# Patient Record
Sex: MALE | Race: WHITE | NOT HISPANIC OR LATINO | Employment: OTHER | URBAN - METROPOLITAN AREA
[De-identification: names, ages, dates, MRNs, and addresses within clinical notes are randomized per-mention and may not be internally consistent; named-entity substitution may affect disease eponyms.]

---

## 2023-05-03 ENCOUNTER — CLINICAL SUPPORT (OUTPATIENT)
Dept: PRIMARY CARE | Facility: CLINIC | Age: 73
End: 2023-05-03
Payer: MEDICARE

## 2023-05-03 DIAGNOSIS — E55.9 VITAMIN D DEFICIENCY: ICD-10-CM

## 2023-05-03 DIAGNOSIS — Z79.899 DRUG THERAPY: ICD-10-CM

## 2023-05-03 DIAGNOSIS — Z13.9 SCREENING FOR CONDITION: ICD-10-CM

## 2023-05-03 LAB
CALCIDIOL (25 OH VITAMIN D3) (NG/ML) IN SER/PLAS: 60 NG/ML
ESTIMATED AVERAGE GLUCOSE FOR HBA1C: 114 MG/DL
HEMOGLOBIN A1C/HEMOGLOBIN TOTAL IN BLOOD: 5.6 %

## 2023-05-03 PROCEDURE — 82306 VITAMIN D 25 HYDROXY: CPT

## 2023-05-03 PROCEDURE — 83036 HEMOGLOBIN GLYCOSYLATED A1C: CPT

## 2023-05-10 ENCOUNTER — OFFICE VISIT (OUTPATIENT)
Dept: PRIMARY CARE | Facility: CLINIC | Age: 73
End: 2023-05-10
Payer: MEDICARE

## 2023-05-10 VITALS
BODY MASS INDEX: 32.2 KG/M2 | OXYGEN SATURATION: 96 % | SYSTOLIC BLOOD PRESSURE: 168 MMHG | HEART RATE: 71 BPM | HEIGHT: 70 IN | WEIGHT: 224.9 LBS | DIASTOLIC BLOOD PRESSURE: 91 MMHG

## 2023-05-10 DIAGNOSIS — B00.9 HERPES SIMPLEX: ICD-10-CM

## 2023-05-10 DIAGNOSIS — R56.9 SEIZURE (MULTI): Primary | ICD-10-CM

## 2023-05-10 DIAGNOSIS — R73.03 PREDIABETES: ICD-10-CM

## 2023-05-10 DIAGNOSIS — E55.9 VITAMIN D DEFICIENCY: ICD-10-CM

## 2023-05-10 DIAGNOSIS — I10 HYPERTENSION, UNSPECIFIED TYPE: ICD-10-CM

## 2023-05-10 DIAGNOSIS — E78.5 HYPERLIPIDEMIA, UNSPECIFIED HYPERLIPIDEMIA TYPE: ICD-10-CM

## 2023-05-10 PROCEDURE — 3008F BODY MASS INDEX DOCD: CPT | Performed by: FAMILY MEDICINE

## 2023-05-10 PROCEDURE — 99214 OFFICE O/P EST MOD 30 MIN: CPT | Performed by: FAMILY MEDICINE

## 2023-05-10 PROCEDURE — 3080F DIAST BP >= 90 MM HG: CPT | Performed by: FAMILY MEDICINE

## 2023-05-10 PROCEDURE — 3077F SYST BP >= 140 MM HG: CPT | Performed by: FAMILY MEDICINE

## 2023-05-10 NOTE — PATIENT INSTRUCTIONS
Patient Discussion/Summary  Next [facility] will be around November.    Lab review today.    ------     Screening for prostate cancer, PSA is 3.16, was 2.3, 1.9, 2.05, 1.39, 3.23. -->> recheck with next annual labs.  Screen for colon cancer, baron colonoscopy around 2019, it was normal, was told to repeat in 5 to 7 years, so that will be around 2024 through 2026.     Hepatitis C screening in November 2022 reveal resolved hepatitis C infection, no viral load.     Need for immunizations. Patient is due for annual flu shot, shingles series, and sounds like due for tetanus/Tdap.. Is up-to-date on coronavirus series. -->> Recommend getting in touch with your pharmacy to get up-to-date on your immunizations.     -----     Obese, BMI 31, no change over the last year. Still down from 230 in 2018.  Patient is planning to increase exercise as well as continue to limit intake of simple carbohydrates.-->> Keep up the good overall work exercising and watching diet.    Seizures.  Had first event around October 2022.  Second event was around January 2023.  Seeing neurology.  Started on Keppra.  Awaiting MRI. -->>  Follow-up as planned.    Next stiffness.  But sounds like it is associated with some paresthesia on the left side.  Does not sound like it is impacting sleep.     Abnormal cardiac CT calcium score of 725, saw Dr. Camacho, had stress test, was told he had minimal blockages and is doing well and did not need tofollow-up with cardiology.     Regarding new labs:   -Prediabetes, new diagnosis. A1c is 5.6, was 5.7, 5.6, 5.5, 5.4, 5.3.  Patient is improving diet, cutting back on simple carbohydrates. -->>  Keep up the good work.  We will recheck with next annual labs.  - Vitamin D deficiency, 60, was 57, 60, 67, 46, 28, 32, our goal is now 75 - 100.  Has been on 50,000 a week for about the last 6 months.  Was on 35,000 weekly for the 2 years before that.  -->>  Creased to 10,000 every day and will recheck in 6 months.    Regarding  previous labs:  - Drug therapy. Screening for condition. -->> recheck annual labs around October 2022.- PSA 2.3, was 1.9, previously 1.39, 3.29. -->> We discussed and will order recheck of PSA with annual labs.  - Hyperlipidemia, HDL was 46, was 41, 39, 41, 37, 44, 42 (oldest), goal is 45 or more, LDL was 68, was 52,57, 57, 51, 49, 113, your goal is 70 or less. -->> Continue atorvastatin 40 mg and CoQ10. Plan to recheck lipids and liver with next annual labs.     Hypertension, BP located today, patient still checking regularly at home and gets numbers 130s/60s. Is on olmesartan/hydrochlorothiazide 40/25.-->> Continue current treatment and continue to monitor blood pressure.      Cold sores, gets up maybe twice a year. He notes the acyclovir is working well when he needs it. -->> will refill as needed.        - Patient will return in November for annual lab review with Medicare wellness visit.   - Come in about a week before the appointment to get the annual labs including PSA screening drawn.

## 2023-05-10 NOTE — PROGRESS NOTES
Subjective   Patient ID: Frankie Guillen is a 72 y.o. male who presents for 6month FU (Pt in today for routine 6 month FU).      Review of Systems  Denies N/V/D/C, no HA/S/V, denies rashes/lesions, no CP.  Has not had for shortness of breath with exertion. Denies fevers/chills.  All other systems were negative.    Objective   Physical Exam  Gen: NAD  eyes: EOMI, PERRLA  ENT: hearing grossly intact, no nasal discharge  resp: CTABL, without R/R  heart: RRR without MRG  GI: abd: S/ND/NT, BS+  lymph: no axillary, cervical, supraclavicular lymphadenopathy noted   MS: gait grossly WNL,  derm: no rashes or lesions noted  neuro: CN II-XII grossly intact  psych: A&Ox3    Assessment/Plan        Pt in today for routine 6 month FU    Patient Discussion/Summary  Next [facility] will be around November.    Lab review today.    ------     Screening for prostate cancer, PSA is 3.16, was 2.3, 1.9, 2.05, 1.39, 3.23. -->> recheck with next annual labs.  Screen for colon cancer, baron colonoscopy around 2019, it was normal, was told to repeat in 5 to 7 years, so that will be around 2024 through 2026.     Hepatitis C screening in November 2022 reveal resolved hepatitis C infection, no viral load.     Need for immunizations. Patient is due for annual flu shot, shingles series, and sounds like due for tetanus/Tdap.. Is up-to-date on coronavirus series. -->> Recommend getting in touch with your pharmacy to get up-to-date on your immunizations.     -----     Obese, BMI 31, no change over the last year. Still down from 230 in 2018.  Patient is planning to increase exercise as well as continue to limit intake of simple carbohydrates.-->> Keep up the good overall work exercising and watching diet.    Seizures.  Had first event around October 2022.  Second event was around January 2023.  Seeing neurology.  Started on Keppra.  Awaiting MRI. -->>  Follow-up as planned.    Next stiffness.  But sounds like it is associated with some paresthesia on the left  side.  Does not sound like it is impacting sleep.     Abnormal cardiac CT calcium score of 725, saw Dr. Camacho, had stress test, was told he had minimal blockages and is doing well and did not need tofollow-up with cardiology.     Regarding new labs:   -Prediabetes, new diagnosis. A1c is 5.6, was 5.7, 5.6, 5.5, 5.4, 5.3.  Patient is improving diet, cutting back on simple carbohydrates. -->>  Keep up the good work.  We will recheck with next annual labs.  - Vitamin D deficiency, 60, was 57, 60, 67, 46, 28, 32, our goal is now 75 - 100.  Has been on 50,000 a week for about the last 6 months.  Was on 35,000 weekly for the 2 years before that.  -->>  Creased to 10,000 every day and will recheck in 6 months.    Regarding previous labs:  - Drug therapy. Screening for condition. -->> recheck annual labs around October 2022.- PSA 2.3, was 1.9, previously 1.39, 3.29. -->> We discussed and will order recheck of PSA with annual labs.  - Hyperlipidemia, HDL was 46, was 41, 39, 41, 37, 44, 42 (oldest), goal is 45 or more, LDL was 68, was 52,57, 57, 51, 49, 113, your goal is 70 or less. -->> Continue atorvastatin 40 mg and CoQ10. Plan to recheck lipids and liver with next annual labs.     Hypertension, BP located today, patient still checking regularly at home and gets numbers 130s/60s. Is on olmesartan/hydrochlorothiazide 40/25.-->> Continue current treatment and continue to monitor blood pressure.      Cold sores, gets up maybe twice a year. He notes the acyclovir is working well when he needs it. -->> will refill as needed.        - Patient will return in November for annual lab review with Medicare wellness visit.   - Come in about a week before the appointment to get the annual labs including PSA screening drawn.

## 2023-07-06 ENCOUNTER — HOSPITAL ENCOUNTER (OUTPATIENT)
Dept: DATA CONVERSION | Facility: HOSPITAL | Age: 73
End: 2023-07-06
Attending: STUDENT IN AN ORGANIZED HEALTH CARE EDUCATION/TRAINING PROGRAM | Admitting: STUDENT IN AN ORGANIZED HEALTH CARE EDUCATION/TRAINING PROGRAM
Payer: MEDICARE

## 2023-07-06 DIAGNOSIS — R56.9 UNSPECIFIED CONVULSIONS (MULTI): ICD-10-CM

## 2023-11-08 ENCOUNTER — CLINICAL SUPPORT (OUTPATIENT)
Dept: PRIMARY CARE | Facility: CLINIC | Age: 73
End: 2023-11-08
Payer: MEDICARE

## 2023-11-08 DIAGNOSIS — Z13.9 SCREENING FOR CONDITION: ICD-10-CM

## 2023-11-08 DIAGNOSIS — Z12.5 SCREENING PSA (PROSTATE SPECIFIC ANTIGEN): ICD-10-CM

## 2023-11-08 DIAGNOSIS — I10 HYPERTENSION, UNSPECIFIED TYPE: ICD-10-CM

## 2023-11-08 DIAGNOSIS — E55.9 VITAMIN D DEFICIENCY: ICD-10-CM

## 2023-11-08 DIAGNOSIS — E78.5 HYPERLIPIDEMIA, UNSPECIFIED HYPERLIPIDEMIA TYPE: ICD-10-CM

## 2023-11-08 DIAGNOSIS — R73.03 PREDIABETES: ICD-10-CM

## 2023-11-08 DIAGNOSIS — Z79.899 DRUG THERAPY: ICD-10-CM

## 2023-11-08 LAB
25(OH)D3 SERPL-MCNC: 71 NG/ML (ref 30–100)
ALBUMIN SERPL BCP-MCNC: 5.1 G/DL (ref 3.4–5)
ALP SERPL-CCNC: 73 U/L (ref 33–136)
ALT SERPL W P-5'-P-CCNC: 25 U/L (ref 10–52)
ANION GAP SERPL CALC-SCNC: 15 MMOL/L (ref 10–20)
APPEARANCE UR: CLEAR
AST SERPL W P-5'-P-CCNC: 19 U/L (ref 9–39)
BASOPHILS # BLD AUTO: 0.07 X10*3/UL (ref 0–0.1)
BASOPHILS NFR BLD AUTO: 0.8 %
BILIRUB SERPL-MCNC: 0.8 MG/DL (ref 0–1.2)
BILIRUB UR STRIP.AUTO-MCNC: NEGATIVE MG/DL
BUN SERPL-MCNC: 20 MG/DL (ref 6–23)
CALCIUM SERPL-MCNC: 10.6 MG/DL (ref 8.6–10.3)
CHLORIDE SERPL-SCNC: 98 MMOL/L (ref 98–107)
CHOLEST SERPL-MCNC: 174 MG/DL (ref 0–199)
CHOLESTEROL/HDL RATIO: 3.9
CO2 SERPL-SCNC: 29 MMOL/L (ref 21–32)
COLOR UR: YELLOW
CREAT SERPL-MCNC: 1.11 MG/DL (ref 0.5–1.3)
EOSINOPHIL # BLD AUTO: 0.32 X10*3/UL (ref 0–0.4)
EOSINOPHIL NFR BLD AUTO: 3.6 %
ERYTHROCYTE [DISTWIDTH] IN BLOOD BY AUTOMATED COUNT: 13.2 % (ref 11.5–14.5)
EST. AVERAGE GLUCOSE BLD GHB EST-MCNC: 117 MG/DL
GFR SERPL CREATININE-BSD FRML MDRD: 70 ML/MIN/1.73M*2
GLUCOSE SERPL-MCNC: 115 MG/DL (ref 74–99)
GLUCOSE UR STRIP.AUTO-MCNC: NEGATIVE MG/DL
HBA1C MFR BLD: 5.7 %
HCT VFR BLD AUTO: 50.3 % (ref 41–52)
HDLC SERPL-MCNC: 44.1 MG/DL
HGB BLD-MCNC: 17.3 G/DL (ref 13.5–17.5)
IMM GRANULOCYTES # BLD AUTO: 0.03 X10*3/UL (ref 0–0.5)
IMM GRANULOCYTES NFR BLD AUTO: 0.3 % (ref 0–0.9)
KETONES UR STRIP.AUTO-MCNC: NEGATIVE MG/DL
LDLC SERPL CALC-MCNC: 76 MG/DL
LEUKOCYTE ESTERASE UR QL STRIP.AUTO: NEGATIVE
LYMPHOCYTES # BLD AUTO: 2.62 X10*3/UL (ref 0.8–3)
LYMPHOCYTES NFR BLD AUTO: 29.7 %
MAGNESIUM SERPL-MCNC: 2.26 MG/DL (ref 1.6–2.4)
MCH RBC QN AUTO: 34.1 PG (ref 26–34)
MCHC RBC AUTO-ENTMCNC: 34.4 G/DL (ref 32–36)
MCV RBC AUTO: 99 FL (ref 80–100)
MONOCYTES # BLD AUTO: 1.15 X10*3/UL (ref 0.05–0.8)
MONOCYTES NFR BLD AUTO: 13.1 %
NEUTROPHILS # BLD AUTO: 4.62 X10*3/UL (ref 1.6–5.5)
NEUTROPHILS NFR BLD AUTO: 52.5 %
NITRITE UR QL STRIP.AUTO: NEGATIVE
NON HDL CHOLESTEROL: 130 MG/DL (ref 0–149)
NRBC BLD-RTO: 0 /100 WBCS (ref 0–0)
PH UR STRIP.AUTO: 7 [PH]
PLATELET # BLD AUTO: 322 X10*3/UL (ref 150–450)
POTASSIUM SERPL-SCNC: 4.8 MMOL/L (ref 3.5–5.3)
PROT SERPL-MCNC: 8.3 G/DL (ref 6.4–8.2)
PROT UR STRIP.AUTO-MCNC: NEGATIVE MG/DL
PSA SERPL-MCNC: 5.45 NG/ML
RBC # BLD AUTO: 5.08 X10*6/UL (ref 4.5–5.9)
RBC # UR STRIP.AUTO: ABNORMAL /UL
RBC #/AREA URNS AUTO: NORMAL /HPF
SODIUM SERPL-SCNC: 137 MMOL/L (ref 136–145)
SP GR UR STRIP.AUTO: 1.02
TRIGL SERPL-MCNC: 272 MG/DL (ref 0–149)
TSH SERPL-ACNC: 2.91 MIU/L (ref 0.44–3.98)
UROBILINOGEN UR STRIP.AUTO-MCNC: <2 MG/DL
VLDL: 54 MG/DL (ref 0–40)
WBC # BLD AUTO: 8.8 X10*3/UL (ref 4.4–11.3)
WBC #/AREA URNS AUTO: NORMAL /HPF

## 2023-11-08 PROCEDURE — 82306 VITAMIN D 25 HYDROXY: CPT

## 2023-11-08 PROCEDURE — 85025 COMPLETE CBC W/AUTO DIFF WBC: CPT

## 2023-11-08 PROCEDURE — G0103 PSA SCREENING: HCPCS

## 2023-11-08 PROCEDURE — 84443 ASSAY THYROID STIM HORMONE: CPT

## 2023-11-08 PROCEDURE — 83036 HEMOGLOBIN GLYCOSYLATED A1C: CPT

## 2023-11-08 PROCEDURE — 80053 COMPREHEN METABOLIC PANEL: CPT

## 2023-11-08 PROCEDURE — 81001 URINALYSIS AUTO W/SCOPE: CPT

## 2023-11-08 PROCEDURE — 36415 COLL VENOUS BLD VENIPUNCTURE: CPT

## 2023-11-08 PROCEDURE — 83735 ASSAY OF MAGNESIUM: CPT

## 2023-11-08 PROCEDURE — 80061 LIPID PANEL: CPT

## 2023-11-08 NOTE — PROGRESS NOTES
Subjective   Patient ID: Frankie uGillen is a 73 y.o. male who presents for Labs Only (Pt in today for lab draw).    HPI     Review of Systems    Objective   There were no vitals taken for this visit.    Physical Exam    Assessment/Plan

## 2023-11-15 ENCOUNTER — OFFICE VISIT (OUTPATIENT)
Dept: PRIMARY CARE | Facility: CLINIC | Age: 73
End: 2023-11-15
Payer: MEDICARE

## 2023-11-15 VITALS
OXYGEN SATURATION: 94 % | WEIGHT: 221 LBS | HEIGHT: 70 IN | SYSTOLIC BLOOD PRESSURE: 156 MMHG | BODY MASS INDEX: 31.64 KG/M2 | DIASTOLIC BLOOD PRESSURE: 87 MMHG | HEART RATE: 64 BPM

## 2023-11-15 DIAGNOSIS — R73.03 PREDIABETES: ICD-10-CM

## 2023-11-15 DIAGNOSIS — E55.9 VITAMIN D DEFICIENCY: ICD-10-CM

## 2023-11-15 DIAGNOSIS — B00.9 HERPES SIMPLEX: ICD-10-CM

## 2023-11-15 DIAGNOSIS — I10 HYPERTENSION, UNSPECIFIED TYPE: ICD-10-CM

## 2023-11-15 DIAGNOSIS — Z13.9 SCREENING FOR CONDITION: ICD-10-CM

## 2023-11-15 DIAGNOSIS — R97.20 ELEVATED PSA: ICD-10-CM

## 2023-11-15 DIAGNOSIS — E66.9 OBESITY WITH SERIOUS COMORBIDITY, UNSPECIFIED CLASSIFICATION, UNSPECIFIED OBESITY TYPE: ICD-10-CM

## 2023-11-15 DIAGNOSIS — R56.9 SEIZURE (MULTI): ICD-10-CM

## 2023-11-15 DIAGNOSIS — E78.5 HYPERLIPIDEMIA, UNSPECIFIED HYPERLIPIDEMIA TYPE: ICD-10-CM

## 2023-11-15 DIAGNOSIS — Z00.00 ROUTINE GENERAL MEDICAL EXAMINATION AT HEALTH CARE FACILITY: Primary | ICD-10-CM

## 2023-11-15 DIAGNOSIS — Z79.899 DRUG THERAPY: ICD-10-CM

## 2023-11-15 PROCEDURE — 3008F BODY MASS INDEX DOCD: CPT | Performed by: FAMILY MEDICINE

## 2023-11-15 PROCEDURE — 3077F SYST BP >= 140 MM HG: CPT | Performed by: FAMILY MEDICINE

## 2023-11-15 PROCEDURE — 1170F FXNL STATUS ASSESSED: CPT | Performed by: FAMILY MEDICINE

## 2023-11-15 PROCEDURE — G0439 PPPS, SUBSEQ VISIT: HCPCS | Performed by: FAMILY MEDICINE

## 2023-11-15 PROCEDURE — 99215 OFFICE O/P EST HI 40 MIN: CPT | Performed by: FAMILY MEDICINE

## 2023-11-15 PROCEDURE — 1036F TOBACCO NON-USER: CPT | Performed by: FAMILY MEDICINE

## 2023-11-15 PROCEDURE — 3079F DIAST BP 80-89 MM HG: CPT | Performed by: FAMILY MEDICINE

## 2023-11-15 PROCEDURE — 3080F DIAST BP >= 90 MM HG: CPT | Performed by: FAMILY MEDICINE

## 2023-11-15 RX ORDER — OLMESARTAN MEDOXOMIL AND HYDROCHLOROTHIAZIDE 40/25 40; 25 MG/1; MG/1
TABLET ORAL
Qty: 90 TABLET | Refills: 3 | Status: SHIPPED | OUTPATIENT
Start: 2023-11-15

## 2023-11-15 RX ORDER — ACYCLOVIR 400 MG/1
TABLET ORAL
Qty: 60 TABLET | Refills: 2
Start: 2023-11-15

## 2023-11-15 RX ORDER — ATORVASTATIN CALCIUM 80 MG/1
TABLET, FILM COATED ORAL
Qty: 90 TABLET | Refills: 3 | Status: SHIPPED | OUTPATIENT
Start: 2023-11-15

## 2023-11-15 ASSESSMENT — ACTIVITIES OF DAILY LIVING (ADL)
BATHING: INDEPENDENT
DRESSING: INDEPENDENT
GROCERY_SHOPPING: INDEPENDENT
DOING_HOUSEWORK: INDEPENDENT
TAKING_MEDICATION: INDEPENDENT
MANAGING_FINANCES: INDEPENDENT

## 2023-11-15 ASSESSMENT — ENCOUNTER SYMPTOMS
OCCASIONAL FEELINGS OF UNSTEADINESS: 0
LOSS OF SENSATION IN FEET: 0
DEPRESSION: 0

## 2023-11-15 NOTE — PATIENT INSTRUCTIONS
Patient Discussion/Summary  Next [facility] and annual lab review.    Lab review today.    ------     Screening for prostate cancer, PSA is 5.4, was 3.16, 2.3, 1.9, 2.05, 1.39, 3.23. -->> recheck in 3 mo.  Screen for colon cancer, had colonoscopy around 2019, it was normal, was told to repeat in 5 to 7 years, so that will be around 2024 through 2026.     Hepatitis C screening in November 2022 reveal resolved hepatitis C infection, no viral load.     Need for immunizations. Patient is due for annual flu shot, shingles series, and sounds like due for tetanus/Tdap.  Due for latest coronavirus booster.  -->> Check with your pharmacy to stay get up-to-date on your immunizations.     -----     Obese with comorbidities, BMI 31, down 3 from last number on chart. Still down from 230 in 2018.  Patient is exercising as well as continue to limit intake of simple carbohydrates.-->> Keep up the good overall work exercising and watching diet.    Seizures.  Had first event around October 2022.  Second event was around January 2023.  Seeing neurology.  No seizures since been on Keppra. -->> Follow-up as planned.    Next stiffness painful left sided posterior occiput.  Worse with certain positions including head rotated right or brushing teeth.  Nerve getting pinched is in the differential.  At this moment patient is coping with it well enough just paying attention to what he is doing.-->>  If it gets bad enough that you would like a referral to physical therapy or orthopedics, call us and I be happy to send a referral either place.  Abnormal cardiac CT calcium score of 725, saw Dr. Camacho, had stress test, was told he had minimal blockages and is doing well and did not need tofollow-up with cardiology.     Regarding new labs:   -Prediabetes.  A1c is 5.7, was 5.6, 5.7, 5.6, 5.5, 5.4, 5.3.  Patient is improving diet, cutting back on simple carbohydrates. -->>  Keep up the good work.  We will recheck with next annual labs.  - Vitamin D  deficiency, 71, was 60, 57, 60, 67, 46, 28, 32, our goal is now 75 - 100.  Has been on 50,000 a week for about the last 6 months.  -->>  Increase to 55,000 a week, so 5000 daily but 4 days a week take 2 pills.  We will recheck with next annual labs.  Creased to 10,000 every day and will recheck in 6 months.  - Drug therapy. Screening for condition. -->> recheck annual labs around nov 2024.  -Elevated PSA, 5.4 on these labs, previously 3.1, 2.3.  -->>  We will recheck in 3 months, if over 4.0 again will refer to urology.  - Hyperlipidemia, HDL was 44, was 46,41, 39, 41, 37, 44, 42 (oldest), goal is 45 or more, LDL 76, was 68, 52, 57, 57, 51, 49, 113, your goal is 70 or less.  Total Bidgood ratio is 3.9, goal is less than 2.8.-->> increase to atorvastatin 80 mg and continue CoQ10. Plan to recheck lipids and liver with next annual labs.     Hypertension, BP elevated today, patient still checking regularly at home and gets numbers 120-130s/60-70s. Is on olmesartan/hydrochlorothiazide 40/25.-->> Continue current treatment and continue to monitor blood pressure.      Cold sores, gets up maybe twice a year. He notes the acyclovir is working well when he needs it. -->> will refill as needed.       - Patient will come in in about 3 months just to get a PSA drawn.  - Patient will return in November 2024 for annual lab review with Medicare wellness visit.   - Come in about a week before the appointment to get the annual labs including PSA screening drawn.

## 2023-11-15 NOTE — PROGRESS NOTES
"Subjective   Reason for Visit: Frankie Guillen is an 73 y.o. male here for a Medicare Wellness visit.     Patient Care Team:  Ricco Silva DO as PCP - General  Ricco Silva DO as PCP [facility]     Review of Systems  Denies N/V/D/C, no HA/S/V, denies rashes/lesions, no CP/SOB. Denies fevers/chills.  All other systems were negative.    Objective   Vitals:  BP (!) 166/91 (BP Location: Left arm, Patient Position: Sitting)   Pulse 65   Ht 1.778 m (5' 10\")   Wt 100 kg (221 lb)   SpO2 99%   BMI 31.71 kg/m²       Physical Exam  Gen: NAD  eyes: EOMI, PERRLA  ENT: hearing grossly intact, no nasal discharge  resp: CTABL, without R/R  heart: RRR without MRG  GI: abd: S/ND/NT, BS+  lymph: no axillary, cervical, supraclavicular lymphadenopathy noted   MS: gait grossly WNL,  derm: no rashes or lesions noted  neuro: CN II-XII grossly intact  psych: A&Ox3    Assessment/Plan   Problem List Items Addressed This Visit    None  Visit Diagnoses       Routine general medical examination at health care facility    -  Primary    Obesity with serious comorbidity, unspecified classification, unspecified obesity type                Patient Discussion/Summary  [facility] and annual lab review.       ------     Screening for prostate cancer, PSA is 5.4, was 3.16, 2.3, 1.9, 2.05, 1.39, 3.23. -->> recheck in 3 mo.  Screen for colon cancer, had colonoscopy around 2019, it was normal, was told to repeat in 5 to 7 years, so that will be around 2024 through 2026.     Hepatitis C screening in November 2022 reveal resolved hepatitis C infection, no viral load.     Need for immunizations. Patient is due for annual flu shot, shingles series, and sounds like due for tetanus/Tdap.  Due for latest coronavirus booster.  -->> Check with your pharmacy to stay get up-to-date on your immunizations.     -----     Obese with comorbidities, BMI 31, down 3 from last number on chart. Still down from 230 in 2018.  Patient is exercising as " well as continue to limit intake of simple carbohydrates.-->> Keep up the good overall work exercising and watching diet.    Seizures.  Had first event around October 2022.  Second event was around January 2023.  Seeing neurology.  No seizures since been on Keppra. -->> Follow-up as planned.    Next stiffness painful left sided posterior occiput.  Worse with certain positions including head rotated right or brushing teeth.  Nerve getting pinched is in the differential.  At this moment patient is coping with it well enough just paying attention to what he is doing.-->>  If it gets bad enough that you would like a referral to physical therapy or orthopedics, call us and I be happy to send a referral either place.  Abnormal cardiac CT calcium score of 725, saw Dr. Camacho, had stress test, was told he had minimal blockages and is doing well and did not need tofollow-up with cardiology.     Regarding new labs:   -Prediabetes.  A1c is 5.7, was 5.6, 5.7, 5.6, 5.5, 5.4, 5.3.  Patient is improving diet, cutting back on simple carbohydrates. -->>  Keep up the good work.  We will recheck with next annual labs.  - Vitamin D deficiency, 71, was 60, 57, 60, 67, 46, 28, 32, our goal is now 75 - 100.  Has been on 50,000 a week for about the last 6 months.  -->>  Increase to 55,000 a week, so 5000 daily but 4 days a week take 2 pills.  We will recheck with next annual labs.  Creased to 10,000 every day and will recheck in 6 months.  - Drug therapy. Screening for condition. -->> recheck annual labs around nov 2024.  -Elevated PSA, 5.4 on these labs, previously 3.1, 2.3.  -->>  We will recheck in 3 months, if over 4.0 again will refer to urology.  - Hyperlipidemia, HDL was 44, was 46,41, 39, 41, 37, 44, 42 (oldest), goal is 45 or more, LDL 76, was 68, 52, 57, 57, 51, 49, 113, your goal is 70 or less.  Total Bidgood ratio is 3.9, goal is less than 2.8.-->> increase to atorvastatin 80 mg and continue CoQ10. Plan to recheck lipids and  liver with next annual labs.     Hypertension, BP elevated today, patient still checking regularly at home and gets numbers 120-130s/60-70s. Is on olmesartan/hydrochlorothiazide 40/25.-->> Continue current treatment and continue to monitor blood pressure.      Cold sores, gets up maybe twice a year. He notes the acyclovir is working well when he needs it. -->> will refill as needed.       - We will recheck blood pressure before patient leaves so we get a good resting value.  - Patient will come in in about 3 months just to get a PSA drawn.  - Patient will return in November 2024 for annual lab review with Medicare wellness visit.   - Come in about a week before the appointment to get the annual labs including PSA screening drawn.

## 2024-02-19 ENCOUNTER — CLINICAL SUPPORT (OUTPATIENT)
Dept: PRIMARY CARE | Facility: CLINIC | Age: 74
End: 2024-02-19
Payer: MEDICARE

## 2024-02-19 DIAGNOSIS — R97.20 ELEVATED PSA: ICD-10-CM

## 2024-02-19 LAB — PSA SERPL-MCNC: 5.45 NG/ML

## 2024-02-19 PROCEDURE — 84153 ASSAY OF PSA TOTAL: CPT

## 2024-02-19 PROCEDURE — 36415 COLL VENOUS BLD VENIPUNCTURE: CPT

## 2024-02-19 NOTE — PROGRESS NOTES
Subjective   Patient ID: Frankie Guillen is a 73 y.o. male who presents for Labs Only (Pt in today for lab draw).    HPI     Review of Systems    Objective   There were no vitals taken for this visit.    Physical Exam    Assessment/Plan          
68

## 2024-11-06 ENCOUNTER — APPOINTMENT (OUTPATIENT)
Dept: PRIMARY CARE | Facility: CLINIC | Age: 74
End: 2024-11-06
Payer: MEDICARE

## 2024-11-06 DIAGNOSIS — R97.20 ELEVATED PSA: ICD-10-CM

## 2024-11-06 DIAGNOSIS — E55.9 VITAMIN D DEFICIENCY: ICD-10-CM

## 2024-11-06 DIAGNOSIS — Z79.899 DRUG THERAPY: ICD-10-CM

## 2024-11-06 DIAGNOSIS — Z13.9 SCREENING FOR CONDITION: ICD-10-CM

## 2024-11-06 LAB
25(OH)D3 SERPL-MCNC: 78 NG/ML (ref 30–100)
ALBUMIN SERPL BCP-MCNC: 4.3 G/DL (ref 3.4–5)
ALP SERPL-CCNC: 68 U/L (ref 33–136)
ALT SERPL W P-5'-P-CCNC: 21 U/L (ref 10–52)
ANION GAP SERPL CALC-SCNC: 13 MMOL/L (ref 10–20)
APPEARANCE UR: CLEAR
AST SERPL W P-5'-P-CCNC: 14 U/L (ref 9–39)
BASOPHILS # BLD AUTO: 0.06 X10*3/UL (ref 0–0.1)
BASOPHILS NFR BLD AUTO: 0.7 %
BILIRUB SERPL-MCNC: 0.5 MG/DL (ref 0–1.2)
BILIRUB UR STRIP.AUTO-MCNC: NEGATIVE MG/DL
BUN SERPL-MCNC: 18 MG/DL (ref 6–23)
CALCIUM SERPL-MCNC: 9.4 MG/DL (ref 8.6–10.3)
CHLORIDE SERPL-SCNC: 102 MMOL/L (ref 98–107)
CHOLEST SERPL-MCNC: 123 MG/DL (ref 0–199)
CHOLESTEROL/HDL RATIO: 3.4
CO2 SERPL-SCNC: 28 MMOL/L (ref 21–32)
COLOR UR: NORMAL
CREAT SERPL-MCNC: 1.19 MG/DL (ref 0.5–1.3)
EGFRCR SERPLBLD CKD-EPI 2021: 64 ML/MIN/1.73M*2
EOSINOPHIL # BLD AUTO: 0.44 X10*3/UL (ref 0–0.4)
EOSINOPHIL NFR BLD AUTO: 4.9 %
ERYTHROCYTE [DISTWIDTH] IN BLOOD BY AUTOMATED COUNT: 13.1 % (ref 11.5–14.5)
EST. AVERAGE GLUCOSE BLD GHB EST-MCNC: 131 MG/DL
GLUCOSE SERPL-MCNC: 113 MG/DL (ref 74–99)
GLUCOSE UR STRIP.AUTO-MCNC: NORMAL MG/DL
HBA1C MFR BLD: 6.2 %
HCT VFR BLD AUTO: 44.2 % (ref 41–52)
HDLC SERPL-MCNC: 36 MG/DL
HGB BLD-MCNC: 15.1 G/DL (ref 13.5–17.5)
IMM GRANULOCYTES # BLD AUTO: 0.04 X10*3/UL (ref 0–0.5)
IMM GRANULOCYTES NFR BLD AUTO: 0.4 % (ref 0–0.9)
KETONES UR STRIP.AUTO-MCNC: NEGATIVE MG/DL
LDLC SERPL CALC-MCNC: 61 MG/DL
LEUKOCYTE ESTERASE UR QL STRIP.AUTO: NEGATIVE
LYMPHOCYTES # BLD AUTO: 2.03 X10*3/UL (ref 0.8–3)
LYMPHOCYTES NFR BLD AUTO: 22.5 %
MAGNESIUM SERPL-MCNC: 1.99 MG/DL (ref 1.6–2.4)
MCH RBC QN AUTO: 33.3 PG (ref 26–34)
MCHC RBC AUTO-ENTMCNC: 34.2 G/DL (ref 32–36)
MCV RBC AUTO: 98 FL (ref 80–100)
MONOCYTES # BLD AUTO: 0.93 X10*3/UL (ref 0.05–0.8)
MONOCYTES NFR BLD AUTO: 10.3 %
NEUTROPHILS # BLD AUTO: 5.54 X10*3/UL (ref 1.6–5.5)
NEUTROPHILS NFR BLD AUTO: 61.2 %
NITRITE UR QL STRIP.AUTO: NEGATIVE
NON HDL CHOLESTEROL: 87 MG/DL (ref 0–149)
NRBC BLD-RTO: 0 /100 WBCS (ref 0–0)
PH UR STRIP.AUTO: 5.5 [PH]
PLATELET # BLD AUTO: 298 X10*3/UL (ref 150–450)
POTASSIUM SERPL-SCNC: 4.6 MMOL/L (ref 3.5–5.3)
PROT SERPL-MCNC: 6.9 G/DL (ref 6.4–8.2)
PROT UR STRIP.AUTO-MCNC: NEGATIVE MG/DL
PSA SERPL-MCNC: 5.25 NG/ML
RBC # BLD AUTO: 4.53 X10*6/UL (ref 4.5–5.9)
RBC # UR STRIP.AUTO: NEGATIVE /UL
SODIUM SERPL-SCNC: 138 MMOL/L (ref 136–145)
SP GR UR STRIP.AUTO: 1.01
TRIGL SERPL-MCNC: 132 MG/DL (ref 0–149)
TSH SERPL-ACNC: 1.66 MIU/L (ref 0.44–3.98)
UROBILINOGEN UR STRIP.AUTO-MCNC: NORMAL MG/DL
VLDL: 26 MG/DL (ref 0–40)
WBC # BLD AUTO: 9 X10*3/UL (ref 4.4–11.3)

## 2024-11-06 PROCEDURE — 83735 ASSAY OF MAGNESIUM: CPT

## 2024-11-06 PROCEDURE — 81003 URINALYSIS AUTO W/O SCOPE: CPT

## 2024-11-06 PROCEDURE — 83036 HEMOGLOBIN GLYCOSYLATED A1C: CPT

## 2024-11-06 PROCEDURE — 85025 COMPLETE CBC W/AUTO DIFF WBC: CPT

## 2024-11-06 PROCEDURE — 84153 ASSAY OF PSA TOTAL: CPT

## 2024-11-06 PROCEDURE — 82306 VITAMIN D 25 HYDROXY: CPT

## 2024-11-06 PROCEDURE — 84443 ASSAY THYROID STIM HORMONE: CPT

## 2024-11-06 PROCEDURE — 80061 LIPID PANEL: CPT

## 2024-11-06 PROCEDURE — 80053 COMPREHEN METABOLIC PANEL: CPT

## 2024-11-06 NOTE — PROGRESS NOTES
Subjective   Patient ID: Frankie Guillen is a 74 y.o. male who presents for Labs Only (Pt in today for lab draw).    HPI     Review of Systems    Objective   There were no vitals taken for this visit.    Physical Exam    Assessment/Plan

## 2024-11-13 ENCOUNTER — APPOINTMENT (OUTPATIENT)
Dept: PRIMARY CARE | Facility: CLINIC | Age: 74
End: 2024-11-13
Payer: MEDICARE

## 2024-11-16 DIAGNOSIS — R97.20 ELEVATED PSA: Primary | ICD-10-CM

## 2024-11-16 DIAGNOSIS — R56.9 SEIZURE (MULTI): Primary | ICD-10-CM

## 2024-11-16 DIAGNOSIS — I10 HYPERTENSION, UNSPECIFIED TYPE: ICD-10-CM

## 2024-11-18 RX ORDER — LEVETIRACETAM 500 MG/1
500 TABLET ORAL 2 TIMES DAILY
Qty: 180 TABLET | Refills: 3 | Status: SHIPPED | OUTPATIENT
Start: 2024-11-18

## 2024-11-18 NOTE — TELEPHONE ENCOUNTER
I am referring patient to urology for elevated PSA.    Was scheduled for Medicare wellness visit last week, it got canceled.  Please reschedule.  Once rescheduled I can send in refill for olmesartan hydrochlorothiazide.  Thanks.

## 2024-11-19 RX ORDER — OLMESARTAN MEDOXOMIL AND HYDROCHLOROTHIAZIDE 40/25 40; 25 MG/1; MG/1
TABLET ORAL
Qty: 90 TABLET | Refills: 3 | Status: SHIPPED | OUTPATIENT
Start: 2024-11-19

## 2024-12-04 ENCOUNTER — APPOINTMENT (OUTPATIENT)
Dept: PRIMARY CARE | Facility: CLINIC | Age: 74
End: 2024-12-04
Payer: MEDICARE

## 2024-12-04 VITALS
WEIGHT: 217 LBS | DIASTOLIC BLOOD PRESSURE: 83 MMHG | SYSTOLIC BLOOD PRESSURE: 150 MMHG | HEART RATE: 76 BPM | OXYGEN SATURATION: 94 % | HEIGHT: 70 IN | BODY MASS INDEX: 31.07 KG/M2

## 2024-12-04 DIAGNOSIS — Z00.00 ROUTINE GENERAL MEDICAL EXAMINATION AT HEALTH CARE FACILITY: Primary | ICD-10-CM

## 2024-12-04 DIAGNOSIS — I10 HYPERTENSION, UNSPECIFIED TYPE: ICD-10-CM

## 2024-12-04 DIAGNOSIS — E55.9 VITAMIN D DEFICIENCY: ICD-10-CM

## 2024-12-04 DIAGNOSIS — R97.20 ELEVATED PSA: ICD-10-CM

## 2024-12-04 DIAGNOSIS — I25.10 CORONARY ARTERY DISEASE INVOLVING NATIVE CORONARY ARTERY OF NATIVE HEART WITHOUT ANGINA PECTORIS: ICD-10-CM

## 2024-12-04 DIAGNOSIS — E78.5 HYPERLIPIDEMIA, UNSPECIFIED HYPERLIPIDEMIA TYPE: ICD-10-CM

## 2024-12-04 DIAGNOSIS — B00.9 HERPES SIMPLEX: ICD-10-CM

## 2024-12-04 DIAGNOSIS — R56.9 SEIZURE (MULTI): ICD-10-CM

## 2024-12-04 DIAGNOSIS — R73.03 PREDIABETES: ICD-10-CM

## 2024-12-04 DIAGNOSIS — Z79.899 DRUG THERAPY: ICD-10-CM

## 2024-12-04 DIAGNOSIS — Z13.9 SCREENING FOR CONDITION: ICD-10-CM

## 2024-12-04 PROCEDURE — 1036F TOBACCO NON-USER: CPT | Performed by: FAMILY MEDICINE

## 2024-12-04 PROCEDURE — 3077F SYST BP >= 140 MM HG: CPT | Performed by: FAMILY MEDICINE

## 2024-12-04 PROCEDURE — 1158F ADVNC CARE PLAN TLK DOCD: CPT | Performed by: FAMILY MEDICINE

## 2024-12-04 PROCEDURE — 3079F DIAST BP 80-89 MM HG: CPT | Performed by: FAMILY MEDICINE

## 2024-12-04 PROCEDURE — 99214 OFFICE O/P EST MOD 30 MIN: CPT | Performed by: FAMILY MEDICINE

## 2024-12-04 PROCEDURE — 1159F MED LIST DOCD IN RCRD: CPT | Performed by: FAMILY MEDICINE

## 2024-12-04 PROCEDURE — 1123F ACP DISCUSS/DSCN MKR DOCD: CPT | Performed by: FAMILY MEDICINE

## 2024-12-04 PROCEDURE — G0439 PPPS, SUBSEQ VISIT: HCPCS | Performed by: FAMILY MEDICINE

## 2024-12-04 PROCEDURE — 1170F FXNL STATUS ASSESSED: CPT | Performed by: FAMILY MEDICINE

## 2024-12-04 RX ORDER — ROSUVASTATIN CALCIUM 40 MG/1
40 TABLET, COATED ORAL DAILY
Qty: 100 TABLET | Refills: 3 | Status: SHIPPED | OUTPATIENT
Start: 2024-12-04 | End: 2026-01-08

## 2024-12-04 ASSESSMENT — ACTIVITIES OF DAILY LIVING (ADL)
DOING_HOUSEWORK: INDEPENDENT
DRESSING: INDEPENDENT
TAKING_MEDICATION: INDEPENDENT
MANAGING_FINANCES: INDEPENDENT
GROCERY_SHOPPING: INDEPENDENT
BATHING: INDEPENDENT

## 2024-12-04 ASSESSMENT — ENCOUNTER SYMPTOMS
LOSS OF SENSATION IN FEET: 0
DEPRESSION: 0
OCCASIONAL FEELINGS OF UNSTEADINESS: 0

## 2024-12-04 ASSESSMENT — PATIENT HEALTH QUESTIONNAIRE - PHQ9
2. FEELING DOWN, DEPRESSED OR HOPELESS: NOT AT ALL
SUM OF ALL RESPONSES TO PHQ9 QUESTIONS 1 AND 2: 0
1. LITTLE INTEREST OR PLEASURE IN DOING THINGS: NOT AT ALL

## 2024-12-04 NOTE — PROGRESS NOTES
"Subjective   Reason for Visit: Frankie Guillen is an 74 y.o. male here for a Medicare Wellness visit.     Patient Care Team:  Ricco Silva DO as PCP - General  [place] [facility] [place] [place]     Review of Systems  Denies N/V/D/C, no HA/S/V, denies rashes/lesions, no CP/SOB. Denies fevers/chills.  All other systems were negative.    Objective   Vitals:  /83 (BP Location: Left arm, Patient Position: Sitting, BP Cuff Size: Large adult)   Pulse 76   Ht 1.778 m (5' 10\")   Wt 98.4 kg (217 lb)   SpO2 94%   BMI 31.14 kg/m²       Physical Exam  Gen: NAD  eyes: EOMI, PERRLA  ENT: hearing grossly intact, no nasal discharge  resp: CTABL, without R/R  heart: RRR without MRG  GI: abd: S/ND/NT, BS+  lymph: no axillary, cervical, supraclavicular lymphadenopathy noted   MS: gait grossly WNL,  derm: no rashes or lesions noted  neuro: CN II-XII grossly intact  psych: A&Ox3    Assessment/Plan   Problem List Items Addressed This Visit       Routine general medical examination at health care facility    Elevated PSA    Hyperlipidemia    Relevant Medications    rosuvastatin (Crestor) 40 mg tablet    Other Relevant Orders    Lipid Panel    Hypertension    Vitamin D deficiency    Relevant Orders    Vitamin D 25-Hydroxy,Total (for eval of Vitamin D levels)    Drug therapy    Relevant Orders    CBC and Auto Differential    Comprehensive Metabolic Panel    Magnesium    Urinalysis with Reflex Microscopic    Seizure (Multi)    Prediabetes    Relevant Orders    Hemoglobin A1C    Screening for condition    Relevant Orders    TSH with reflex to Free T4 if abnormal    Herpes simplex    BMI 31.0-31.9,adult     Other Visit Diagnoses       Coronary artery disease involving native coronary artery of native heart without angina pectoris    -  Primary    Relevant Orders    Referral to Cardiology              Patient Discussion/Summary  [facility] and annual lab review.       ------     Screening for prostate cancer, " PSA is 5.25, was 5.4, 3.16, 2.3, 1.9, 2.05, 1.39, 3.23.  Patient has recently started taking an over-the-counter prostate pill. -->>  Patient is scheduled to see urology in the next few months.     Hepatitis C screening in November 2022 reveal resolved hepatitis C infection, no viral load.     Immunization counseling: Patient is due for annual flu shot, shingles series, and sounds like due for tetanus/Tdap.  Due for latest coronavirus booster.  -->> Check with your pharmacy to stay get up-to-date on your immunizations.     -----     Obese with comorbidities, BMI 31, down another 4 from a year ago. Still down from 230 in 2018.  Patient is exercising as well as continue to limit intake of simple carbohydrates.-->> Keep up the good overall work exercising and watching diet.    Seizures.  Had first event around October 2022.  Second event was around January 2023.  Seeing neurology/Dr. THANH Hidalgo.  No seizures since been on Keppra. -->> Follow-up as planned.    Next stiffness. Improved. Has been doing accupuncture for feet. -->> If it gets bad enough that you would like a referral to physical therapy or orthopedics, call us and I be happy to send a referral either place.    CAD, Abnormal cardiac CT calcium score of 725, saw Dr. Camacho, had stress test, was told he had minimal blockages.  Probably stand more recent reevaluation. -->>  Refer to cardiology for evaluation and treatment.     Regarding new labs:   - Prediabetes.  A1c is 6.2, was 5.7, 5.6, 5.7, 5.6, 5.5, 5.4, 5.3.  Patient is improving diet, cutting back on simple carbohydrates. -->>  Keep up the good work.  We will recheck with next annual labs.  - Vitamin D deficiency, 78, was 71, 60, 57, 60, 67, 46, 28, 32, our goal is now 75 - 100.  Has been on 50,000 a week for about the last 18 months.  -->>  Continue current dosing.  Recheck with annual labs.  - Drug therapy. Screening for condition. -->>  ADELINE within normal limits, so no thyroid problems noted.    -  Elevated PSA, 5.45.25, was 5.45, 5.45, 3.1, 2.3.  -->>  We will recheck in 3 months, if over 4.0 again will refer to urology.  - Hyperlipidemia, LDL 61, goal is less than 70.  HDL is 36 would like it over 45.  Total to good ratio is 3.4, it would be nice if it is under 2.8.  Cholesterol is fairly well-controlled on atorvastatin 80 mg plus co-Q10.  However with the A1c increase will switch to rosuvastatin.-->>  Discontinue atorvastatin, start rosuvastatin 40 mg daily.  Continue co-Q10.  Plan to recheck lipids and liver with next annual labs.     Hypertension, BP elevated today, patient still checking regularly at home and gets numbers 120-130s/60-70s. Is on olmesartan/hydrochlorothiazide 40/25.-->> Continue current treatment and continue to monitor blood pressure.      Cold sores, gets up maybe twice a year. He notes the acyclovir is working well when he needs it. -->> will refill as needed.         - Patient will return in November 2025 for annual lab review with Medicare wellness visit.   - Come in about a week before the appointment to get the annual labs drawn.

## 2024-12-04 NOTE — PATIENT INSTRUCTIONS
[facility] [place] and annual lab review.       ------     Screening for prostate cancer, PSA is 5.25, was 5.4, 3.16, 2.3, 1.9, 2.05, 1.39, 3.23.  Patient has recently started taking an over-the-counter prostate pill. -->>  Patient is scheduled to see urology in the next few months.     Hepatitis C screening in November 2022 reveal resolved hepatitis C infection, no viral load.     Immunization counseling: Patient is due for annual flu shot, shingles series, and sounds like due for tetanus/Tdap.  Due for latest coronavirus booster.  -->> Check with your pharmacy to stay get up-to-date on your immunizations.     -----     Obese with comorbidities, BMI 31, down another 4 from a year ago. Still down from 230 in 2018.  Patient is exercising as well as continue to limit intake of simple carbohydrates.-->> Keep up the good overall work exercising and watching diet.    Seizures.  Had first event around October 2022.  Second event was around January 2023.  Seeing neurology/Dr. THANH Hidalgo.  No seizures since been on Keppra. -->> Follow-up as planned.    Next stiffness. Improved. Has been doing accupuncture for feet. -->> If it gets bad enough that you would like a referral to physical therapy or orthopedics, call us and I be happy to send a referral either place.    CAD, Abnormal cardiac CT calcium score of 725, saw Dr. Camacho, had stress test, was told he had minimal blockages.  Probably stand more recent reevaluation. -->>  Refer to cardiology for evaluation and treatment.     Regarding new labs:   - Prediabetes.  A1c is 6.2, was 5.7, 5.6, 5.7, 5.6, 5.5, 5.4, 5.3.  Patient is improving diet, cutting back on simple carbohydrates. -->>  Keep up the good work.  We will recheck with next annual labs.  - Vitamin D deficiency, 78, was 71, 60, 57, 60, 67, 46, 28, 32, our goal is now 75 - 100.  Has been on 50,000 a week for about the last 18 months.  -->>  Continue current dosing.  Recheck with annual labs.  - Drug therapy. Screening for  condition. -->>  ADELINE within normal limits, so no thyroid problems noted.    - Elevated PSA, 5.45.25, was 5.45, 5.45, 3.1, 2.3.  -->>  We will recheck in 3 months, if over 4.0 again will refer to urology.  - Hyperlipidemia, LDL 61, goal is less than 70.  HDL is 36 would like it over 45.  Total to good ratio is 3.4, it would be nice if it is under 2.8.  Cholesterol is fairly well-controlled on atorvastatin 80 mg plus co-Q10.  However with the A1c increase will switch to rosuvastatin.-->>  Discontinue atorvastatin, start rosuvastatin 40 mg daily.  Continue co-Q10.  Plan to recheck lipids and liver with next annual labs.     Hypertension, BP elevated today, patient still checking regularly at home and gets numbers 120-130s/60-70s. Is on olmesartan/hydrochlorothiazide 40/25.-->> Continue current treatment and continue to monitor blood pressure.      Cold sores, gets up maybe twice a year. He notes the acyclovir is working well when he needs it. -->> will refill as needed.         - Patient will return in November 2025 for annual lab review with Medicare wellness visit.   - Come in about a week before the appointment to get the annual labs drawn.

## 2024-12-24 DIAGNOSIS — E78.5 HYPERLIPIDEMIA, UNSPECIFIED HYPERLIPIDEMIA TYPE: ICD-10-CM

## 2024-12-26 RX ORDER — ATORVASTATIN CALCIUM 80 MG/1
TABLET, FILM COATED ORAL
Qty: 90 TABLET | Refills: 3 | OUTPATIENT
Start: 2024-12-26

## 2024-12-27 NOTE — TELEPHONE ENCOUNTER
He was switched to crestor/rosuvastatin on 12/4/24. He is no longer on atorvastatin/lipitor. (But should be taking crestor/rosuvastatin 4mg). Thanks.

## 2025-01-02 ENCOUNTER — APPOINTMENT (OUTPATIENT)
Dept: UROLOGY | Facility: CLINIC | Age: 75
End: 2025-01-02
Payer: MEDICARE

## 2025-01-06 RX ORDER — ROSUVASTATIN CALCIUM 40 MG/1
40 TABLET, COATED ORAL DAILY
Qty: 100 TABLET | Refills: 3 | Status: SHIPPED | OUTPATIENT
Start: 2025-01-06 | End: 2026-02-10

## 2025-01-13 ENCOUNTER — APPOINTMENT (OUTPATIENT)
Dept: CARDIOLOGY | Facility: CLINIC | Age: 75
End: 2025-01-13
Payer: MEDICARE

## 2025-02-27 ENCOUNTER — OFFICE VISIT (OUTPATIENT)
Dept: UROLOGY | Facility: CLINIC | Age: 75
End: 2025-02-27
Payer: MEDICARE

## 2025-02-27 DIAGNOSIS — R97.20 ELEVATED PSA: ICD-10-CM

## 2025-02-27 PROCEDURE — G2211 COMPLEX E/M VISIT ADD ON: HCPCS | Performed by: UROLOGY

## 2025-02-27 PROCEDURE — 1036F TOBACCO NON-USER: CPT | Performed by: UROLOGY

## 2025-02-27 PROCEDURE — 1160F RVW MEDS BY RX/DR IN RCRD: CPT | Performed by: UROLOGY

## 2025-02-27 PROCEDURE — 99203 OFFICE O/P NEW LOW 30 MIN: CPT | Performed by: UROLOGY

## 2025-02-27 PROCEDURE — 99213 OFFICE O/P EST LOW 20 MIN: CPT | Performed by: UROLOGY

## 2025-02-27 PROCEDURE — 1159F MED LIST DOCD IN RCRD: CPT | Performed by: UROLOGY

## 2025-02-27 PROCEDURE — 1123F ACP DISCUSS/DSCN MKR DOCD: CPT | Performed by: UROLOGY

## 2025-02-27 RX ORDER — ALLOPURINOL 100 MG/1
100 TABLET ORAL DAILY
COMMUNITY
Start: 2025-02-02

## 2025-02-27 ASSESSMENT — ENCOUNTER SYMPTOMS
LOSS OF SENSATION IN FEET: 0
DEPRESSION: 0
OCCASIONAL FEELINGS OF UNSTEADINESS: 0

## 2025-02-27 ASSESSMENT — PATIENT HEALTH QUESTIONNAIRE - PHQ9
1. LITTLE INTEREST OR PLEASURE IN DOING THINGS: NOT AT ALL
2. FEELING DOWN, DEPRESSED OR HOPELESS: NOT AT ALL
SUM OF ALL RESPONSES TO PHQ9 QUESTIONS 1 AND 2: 0

## 2025-02-27 NOTE — PROGRESS NOTES
CHIEF COMPLAINT:  Presents to the office today to discuss:  1. Elevated PSA    HPI TODAY: 02/27/2025:  PSA elevation noted with recent value of 5.25 on 11/06/2024 and prior value of 5.45 on 02/19/2024. Recent urinalysis was bland with no hematuria. Recently started saw palmetto supplementation. A1c is 6.2.    Urinary Symptoms:  - Reports occasional weak stream, particularly at night  - Occasional hesitancy, not frequent  - Nocturia 1-2 times per night, occasionally up to 3-4 times with increased fluid intake before bed  - Denies gross hematuria  - Not bothered    PSA History:  - 02/19/2024: 5.45  - 11/06/2024: 5.25    PAST MEDICAL HISTORY:  - Hypertension  - Hyperlipidemia  - BMI 31  - Seizure disorder (diagnosed 2024, on Keppra)  - Herpes (on acyclovir)  - Appendectomy at age 11    SOCIAL:  No information provided    REVIEW OF SYSTEMS:  A tailored review of systems was performed and all pertinent positives and negatives are listed in the HPI.    PHYSICAL EXAMINATION:  Gen: NAD  Pulm: No increased WOB on RA  Cards: WWP  Abd: SNTND, no palpable masses  No CVAT  : MARCIA reveals enlarged, smooth prostate without nodules, roughly 50-60g gland    ASSESSMENT/PLAN:    Elevated PSA (R97.2)  - Assessment: Mildly elevated PSA likely due to prostatic enlargement given exam findings and urinary symptoms  - Plan: Repeat PSA in 1 week (to avoid interference from MARCIA), then again in 6 months. Will defer prostate MRI at this time given exam findings suggesting benign prostatic enlargement as etiology. If significant PSA elevation noted on repeat testing, will proceed with prostate MRI  - Counseling: Discussed PSA elevation, including common causes such as benign prostatic hyperplasia. Explained PSA density concept and current findings suggesting benign etiology    The patient provided verbal consent for the audio recording of today's encounter using AI.

## 2025-03-10 LAB — PSA SERPL-MCNC: 5.97 NG/ML

## 2025-06-13 ENCOUNTER — OFFICE VISIT (OUTPATIENT)
Dept: URGENT CARE | Age: 75
End: 2025-06-13
Payer: MEDICARE

## 2025-06-13 ENCOUNTER — ANCILLARY PROCEDURE (OUTPATIENT)
Dept: URGENT CARE | Age: 75
End: 2025-06-13
Payer: MEDICARE

## 2025-06-13 VITALS
SYSTOLIC BLOOD PRESSURE: 135 MMHG | DIASTOLIC BLOOD PRESSURE: 70 MMHG | HEIGHT: 70 IN | RESPIRATION RATE: 18 BRPM | TEMPERATURE: 97.9 F | OXYGEN SATURATION: 94 % | BODY MASS INDEX: 31.07 KG/M2 | WEIGHT: 217 LBS | HEART RATE: 85 BPM

## 2025-06-13 DIAGNOSIS — S70.02XA CONTUSION OF LEFT HIP, INITIAL ENCOUNTER: Primary | ICD-10-CM

## 2025-06-13 DIAGNOSIS — S79.912A HIP INJURY, LEFT, INITIAL ENCOUNTER: ICD-10-CM
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
## 2025-06-13 PROBLEM — E66.9 OBESITY WITH BODY MASS INDEX 30 OR GREATER: Status: ACTIVE | Noted: 2025-06-13

## 2025-06-13 PROBLEM — M19.90 OSTEOARTHRITIS: Status: ACTIVE | Noted: 2025-06-13

## 2025-06-13 PROBLEM — E66.3 OVERWEIGHT: Status: ACTIVE | Noted: 2025-06-13

## 2025-06-13 PROBLEM — B00.9 HERPESVIRUS INFECTION: Status: ACTIVE | Noted: 2025-06-13

## 2025-06-13 PROBLEM — M25.552 HIP PAIN, LEFT: Status: ACTIVE | Noted: 2025-06-13

## 2025-06-13 PROBLEM — R56.9 SEIZURES (MULTI): Status: ACTIVE | Noted: 2025-06-13

## 2025-06-13 PROBLEM — B00.89 HERPES DERMATITIS: Status: ACTIVE | Noted: 2025-06-13

## 2025-06-13 PROBLEM — I10 BENIGN ESSENTIAL HYPERTENSION: Status: ACTIVE | Noted: 2025-06-13

## 2025-06-13 PROBLEM — R73.03 PRE-DIABETES: Status: ACTIVE | Noted: 2025-06-13

## 2025-06-13 PROBLEM — M25.559 ARTHRALGIA OF HIP: Status: ACTIVE | Noted: 2025-06-13

## 2025-06-13 PROCEDURE — 73502 X-RAY EXAM HIP UNI 2-3 VIEWS: CPT | Mod: LEFT SIDE

## 2025-06-13 RX ORDER — LIDOCAINE 50 MG/G
1 PATCH TOPICAL DAILY
Qty: 20 PATCH | Refills: 0 | Status: SHIPPED | OUTPATIENT
Start: 2025-06-13 | End: 2025-06-23

## 2025-06-13 NOTE — PROGRESS NOTES
"Subjective   Patient ID: Frankie Guillen is a 74 y.o. male. They present today with a chief complaint of Hip Injury (Fell by pool 6/9 hit hip and has been in a lot of pain, unable to sit for a long period of time).    History of Present Illness  The patient presents five days after he sustained a mechanical fall while he was cleaning his pool. He states that he stepped into a hole, which caused him to trip and injure his left hip. He denies head injury, neck pain, or anticoagulant use. He was not dizzy, lightheaded, or experiencing chest pain prior to his fall. The pain is located on the posterior aspect of the hip. He states that he is unable to sit; however, he is able to walk. When sitting, he rates his pain 8 to 9 out of 10. Last night he took two ibuprofen, but he has not taken any pain medication this morning. He denies bruising, numbness, or paresthesia.     Review of Systems  Constitutional: Negative for fever, chills, anorexia, weight loss, malaise   Musculoskeletal: See HPI  Neurological: Negative for weakness, paresthesia    Skin: Negative for mass, pain, itching, rash, ulcer    Hematologic/Lymph: Negative bruising  All other systems are negative        History provided by:  Patient   used: No        Past Medical History  Allergies as of 06/13/2025    (No Known Allergies)       Prescriptions Prior to Admission[1]     Medical History[2]    Surgical History[3]     reports that he quit smoking about 40 years ago. His smoking use included cigarettes. He has never used smokeless tobacco.    Review of Systems  Review of Systems                               Objective    Vitals:    06/13/25 0900   BP: 135/70   Pulse: 85   Resp: 18   Temp: 36.6 °C (97.9 °F)   SpO2: 94%   Weight: 98.4 kg (217 lb)   Height: 1.778 m (5' 10\")     No LMP for male patient.    Physical Exam  Vitals and nursing note reviewed.   Constitutional:       General: He is not in acute distress.     Appearance: Normal " appearance. He is normal weight. He is not ill-appearing or diaphoretic.   Cardiovascular:      Rate and Rhythm: Normal rate.   Pulmonary:      Effort: Pulmonary effort is normal. No respiratory distress.   Musculoskeletal:         General: Tenderness and signs of injury present. No swelling or deformity.      Left hip: Tenderness and bony tenderness present. No deformity or crepitus. Decreased range of motion.   Skin:     General: Skin is warm and dry.      Coloration: Skin is not jaundiced or pale.      Findings: No bruising, erythema or rash.   Neurological:      General: No focal deficit present.      Mental Status: He is alert and oriented to person, place, and time.      Sensory: No sensory deficit.      Motor: No weakness.      Gait: Gait normal.         Procedures    Point of Care Test & Imaging Results from this visit  No results found for this visit on 06/13/25.   Imaging  No results found.    Cardiology, Vascular, and Other Imaging  No other imaging results found for the past 2 days      Diagnostic study results (if any) were reviewed by DEE Flanagan.    Assessment/Plan   Allergies, medications, history, and pertinent labs/EKGs/Imaging reviewed by DEE Flanagan.     Medical Decision Making    On exam the patient's vital signs are stable, and he is in no acute distress. There was no visible deformity, and he ambulated without assistance. Tenderness was present in the left posterior hip joint. An X-ray of the left hip was obtained and had the following results:     No acute pathologic findings are identified. Mild right hip osteoarthritis. Marked lumbar spondylosis. Atherosclerosis.     I discussed the results of the X-ray with the patient prior to his discharge. We discussed supportive care including pain management, rest and ice application. I also recommended trying topical medications like Lidoderm. A prescription was sent to the pharmacy, and he was instructed to purchase over  the counter topical medications if he desires. If there is any difficulty obtaining the prescription. There's no acute distress noted on discharge, and his gate was steady.     Testing: Xray left hip    Treatment: Supportive, OTC analgesics, ice, PCP follow-up    Differential: 1) Contusion, 2) Hip/pelvic fracture, 3)  Hip dislocation    Impression: Left hip contusion due to fall    The patient’s allergies, current medications, medical/surgical history, and immunizations were reviewed. Updates were entered if required.      Records reviewed included recent laboratory results, imaging, hospital admissions, and primary care/specialist provider notes.     We discussed with the patient our clinical thoughts at this time given the above findings and clinical assessment and we had a shared decision-making conversation in a patient-centered decision-making model on how to proceed forward. The patient was instructed on the importance of a close follow-up with PCP and other care providers. The patient was also advised that an Urgent care diagnosis is often a preliminary impression and that definitive care is often not able to be given completley in the Urgent care setting.     At time of discharge patient was clinically well-appearing and appropriate for outpatient management. The patient was educated regarding diagnosis, supportive care, OTC and Rx medications. The patient was given the opportunity to ask questions prior to discharge.  They verbalized understanding of my discussion of the plans for treatment, expected course, indications to return to  or seek further evaluation in ED, and the need for timely follow up as directed.         Orders and Diagnoses  Diagnoses and all orders for this visit:  Hip injury, left, initial encounter  -     XR hip left with pelvis when performed 2 or 3 views; Future      Medical Admin Record      Patient disposition: Home    Electronically signed by DEE Flanagan  9:15  AM           [1] (Not in a hospital admission)  [2]   Past Medical History:  Diagnosis Date    Personal history of other diseases of the circulatory system     History of hypertension    Personal history of other diseases of the digestive system     History of hepatic disease    Personal history of other diseases of the musculoskeletal system and connective tissue     History of arthritis    Personal history of pneumonia (recurrent) 06/13/2017    History of pneumonia   [3]   Past Surgical History:  Procedure Laterality Date    APPENDECTOMY  06/13/2017    Appendectomy

## 2025-08-01 DIAGNOSIS — R97.20 ELEVATED PSA: ICD-10-CM

## 2025-08-07 ENCOUNTER — PREP FOR PROCEDURE (OUTPATIENT)
Dept: PODIATRY | Facility: CLINIC | Age: 75
End: 2025-08-07

## 2025-08-07 ENCOUNTER — APPOINTMENT (OUTPATIENT)
Dept: PODIATRY | Facility: CLINIC | Age: 75
End: 2025-08-07
Payer: MEDICARE

## 2025-08-07 DIAGNOSIS — G58.8 NEUROMA DIGITAL NERVE: Primary | ICD-10-CM

## 2025-08-07 PROCEDURE — 1160F RVW MEDS BY RX/DR IN RCRD: CPT | Performed by: PODIATRIST

## 2025-08-07 PROCEDURE — 1159F MED LIST DOCD IN RCRD: CPT | Performed by: PODIATRIST

## 2025-08-07 PROCEDURE — 99204 OFFICE O/P NEW MOD 45 MIN: CPT | Performed by: PODIATRIST

## 2025-08-07 NOTE — PROGRESS NOTES
History Of Present Illness  Frankie Guillen is a 74 y.o. male presenting with chief complaint of: right foot pain.  Patient saw another podiatrist in the past.  Is approximately 1 year ago.  He did receive a cortisone shot in between 3rd and 4th metatarsal heads with no relief in significant pain.  Patient states the pain is making it difficult for him to ambulate and wear shoe gear.  Patient states his day-to-day discomfort is intense.    PCP Ricco Silva DO  Last visit 12/4/24     Past Medical History  He has a past medical history of Personal history of other diseases of the circulatory system, Personal history of other diseases of the digestive system, Personal history of other diseases of the musculoskeletal system and connective tissue, and Personal history of pneumonia (recurrent) (06/13/2017).    Surgical History  He has a past surgical history that includes Appendectomy (06/13/2017).     Social History  He reports that he quit smoking about 40 years ago. His smoking use included cigarettes. He has never used smokeless tobacco. No history on file for alcohol use and drug use.    Family History  Family History[1]     Allergies  Patient has no known allergies.    Medications  Current Medications[2]    Review of Systems    REVIEW OF SYSTEMS  GENERAL:  Negative for malaise, significant weight loss, fever  CARDIOVASCULAR: leg swelling   MUSCULOSKELETAL:  Negative for joint pain or swelling, back pain, and muscle pain.  SKIN:  Negative for lesions, rash, and itching  PSYCH:  Negative for sleep disturbance, mood disorder and recent psychosocial stressors  NEURO: Negative, denies any burning, tingling or numbness     Objective: Ambulatory.  Vasc: DP and PT pulses are palpable bilateral.  CFT is less than 3 seconds bilateral.  Skin temperature is warm to cool proximal to distal bilateral.      Neuro:  Light touch is intact to the foot bilateral.  Positive Gustavo's click with palpation of the third interspace right  foot.  There is no clonus noted.  The hallux is downgoing bilateral.      Derm: Nails are normal. Skin is supple with normal texture and turgor noted.  Webspaces are clean, dry and intact bilateral.  There are no hyperkeratoses, ulcerations, verruca or other lesions noted.      Ortho: Muscle strength is 5/5 for all pedal groups tested.  Ankle joint, subtalar joint, 1st MPJ and lesser MPJ ROM is full and without pain or crepitus.  The foot type is rectus bilateral off weight bearing.  There are no structural deformities noted.  Mild bunion and tailor's bunion is noted on on the right foot.  Forefoot is wide.    Assessment/Plan     Diagnoses and all orders for this visit:  Neuroma digital nerve      Patient has a clinical interdigital neuroma.  We discussed etiology of neuroma formation.  We discussed treatment for neuroma.  Patient does not want to consider another injection.  He has modified his shoe gear choices to buy extrawide.  We did discuss the option of surgery and patient would like to proceed.  Benefits risks and possible complications of the OR have been explained in depth with the patient and including stump neuroma infection and delayed healing.  Patient would like surgery as soon as possible.  We have scheduled for August 19.                    [1] No family history on file.  [2]   Current Outpatient Medications   Medication Sig Dispense Refill    acyclovir (Zovirax) 400 mg tablet At first sign of cold sore start taking 2 by mouth 3 times daily for 2 days (Patient taking differently: if needed. At first sign of cold sore start taking 2 by mouth 3 times daily for 2 days) 60 tablet 2    allopurinol (Zyloprim) 100 mg tablet Take 1 tablet (100 mg) by mouth once daily.      olmesartan-hydrochlorothiazide (BENIcar HCT) 40-25 mg tablet TAKE 1 TABLET BY MOUTH EVERY DAY 90 tablet 3    rosuvastatin (Crestor) 40 mg tablet Take 1 tablet (40 mg) by mouth once daily. 100 tablet 3    levETIRAcetam (Keppra) 500 mg  tablet TAKE 1 TABLET BY MOUTH TWICE A DAY (Patient not taking: Reported on 8/7/2025) 180 tablet 3     No current facility-administered medications for this visit.

## 2025-08-11 ASSESSMENT — DUKE ACTIVITY SCORE INDEX (DASI)
DASI METS SCORE: 7
TOTAL_SCORE: 34.7
CAN YOU TAKE CARE OF YOURSELF (EAT, DRESS, BATHE, OR USE TOILET): YES
CAN YOU CLIMB A FLIGHT OF STAIRS OR WALK UP A HILL: YES
CAN YOU DO YARD WORK LIKE RAKING LEAVES, WEEDING OR PUSHING A MOWER: YES
CAN YOU PARTICIPATE IN STRENOUS SPORTS LIKE SWIMMING, SINGLES TENNIS, FOOTBALL, BASKETBALL, OR SKIING: NO
CAN YOU DO HEAVY WORK AROUND THE HOUSE LIKE SCRUBBING FLOORS OR LIFTING AND MOVING HEAVY FURNITURE: NO
CAN YOU PARTICIPATE IN MODERATE RECREATIONAL ACTIVITIES LIKE GOLF, BOWLING, DANCING, DOUBLES TENNIS OR THROWING A BASEBALL OR FOOTBALL: YES
CAN YOU RUN A SHORT DISTANCE: NO
CAN YOU WALK INDOORS, SUCH AS AROUND YOUR HOUSE: YES
CAN YOU WALK A BLOCK OR TWO ON LEVEL GROUND: YES
CAN YOU HAVE SEXUAL RELATIONS: YES
CAN YOU DO LIGHT WORK AROUND THE HOUSE LIKE DUSTING OR WASHING DISHES: YES
CAN YOU DO MODERATE WORK AROUND THE HOUSE LIKE VACUUMING, SWEEPING FLOORS OR CARRYING GROCERIES: YES

## 2025-08-11 ASSESSMENT — ACTIVITIES OF DAILY LIVING (ADL): ADL_SCORE: 0

## 2025-08-11 ASSESSMENT — LIFESTYLE VARIABLES: SMOKING_STATUS: NONSMOKER

## 2025-08-12 ENCOUNTER — PRE-ADMISSION TESTING (OUTPATIENT)
Dept: PREADMISSION TESTING | Facility: HOSPITAL | Age: 75
End: 2025-08-12
Payer: MEDICARE

## 2025-08-12 ENCOUNTER — LAB (OUTPATIENT)
Dept: LAB | Facility: HOSPITAL | Age: 75
End: 2025-08-12
Payer: MEDICARE

## 2025-08-12 VITALS
SYSTOLIC BLOOD PRESSURE: 180 MMHG | HEART RATE: 94 BPM | OXYGEN SATURATION: 96 % | BODY MASS INDEX: 30.71 KG/M2 | WEIGHT: 214.51 LBS | HEIGHT: 70 IN | DIASTOLIC BLOOD PRESSURE: 84 MMHG | TEMPERATURE: 97.5 F | RESPIRATION RATE: 16 BRPM

## 2025-08-12 DIAGNOSIS — R79.9 ABNORMAL FINDING OF BLOOD CHEMISTRY, UNSPECIFIED: ICD-10-CM

## 2025-08-12 DIAGNOSIS — Z01.818 ENCOUNTER FOR OTHER PREPROCEDURAL EXAMINATION: Primary | ICD-10-CM

## 2025-08-12 DIAGNOSIS — Z01.818 PRE-OP TESTING: ICD-10-CM

## 2025-08-12 DIAGNOSIS — Z01.818 PRE-OP EXAMINATION: Primary | ICD-10-CM

## 2025-08-12 LAB
ANION GAP SERPL CALC-SCNC: 14 MMOL/L (ref 10–20)
ATRIAL RATE: 88 BPM
BUN SERPL-MCNC: 14 MG/DL (ref 6–23)
CALCIUM SERPL-MCNC: 10.3 MG/DL (ref 8.6–10.3)
CHLORIDE SERPL-SCNC: 96 MMOL/L (ref 98–107)
CO2 SERPL-SCNC: 29 MMOL/L (ref 21–32)
CREAT SERPL-MCNC: 0.99 MG/DL (ref 0.5–1.3)
EGFRCR SERPLBLD CKD-EPI 2021: 79 ML/MIN/1.73M*2
EST. AVERAGE GLUCOSE BLD GHB EST-MCNC: 123 MG/DL
GLUCOSE SERPL-MCNC: 115 MG/DL (ref 74–99)
HBA1C MFR BLD: 5.9 % (ref ?–5.7)
P AXIS: 69 DEGREES
P OFFSET: 175 MS
P ONSET: 114 MS
POTASSIUM SERPL-SCNC: 4.1 MMOL/L (ref 3.5–5.3)
PR INTERVAL: 210 MS
Q ONSET: 219 MS
QRS COUNT: 14 BEATS
QRS DURATION: 80 MS
QT INTERVAL: 364 MS
QTC CALCULATION(BAZETT): 440 MS
QTC FREDERICIA: 413 MS
R AXIS: 12 DEGREES
SODIUM SERPL-SCNC: 135 MMOL/L (ref 136–145)
T AXIS: 59 DEGREES
T OFFSET: 401 MS
VENTRICULAR RATE: 88 BPM

## 2025-08-12 PROCEDURE — 93005 ELECTROCARDIOGRAM TRACING: CPT

## 2025-08-12 PROCEDURE — 36415 COLL VENOUS BLD VENIPUNCTURE: CPT

## 2025-08-12 PROCEDURE — 80048 BASIC METABOLIC PNL TOTAL CA: CPT

## 2025-08-12 PROCEDURE — 99203 OFFICE O/P NEW LOW 30 MIN: CPT | Performed by: NURSE PRACTITIONER

## 2025-08-12 PROCEDURE — 83036 HEMOGLOBIN GLYCOSYLATED A1C: CPT

## 2025-08-12 ASSESSMENT — PAIN - FUNCTIONAL ASSESSMENT: PAIN_FUNCTIONAL_ASSESSMENT: 0-10

## 2025-08-19 ENCOUNTER — ANESTHESIA EVENT (OUTPATIENT)
Dept: OPERATING ROOM | Facility: HOSPITAL | Age: 75
End: 2025-08-19
Payer: MEDICARE

## 2025-08-19 ENCOUNTER — PHARMACY VISIT (OUTPATIENT)
Dept: PHARMACY | Facility: CLINIC | Age: 75
End: 2025-08-19
Payer: COMMERCIAL

## 2025-08-19 ENCOUNTER — HOSPITAL ENCOUNTER (OUTPATIENT)
Facility: HOSPITAL | Age: 75
Setting detail: OUTPATIENT SURGERY
Discharge: HOME | End: 2025-08-19
Attending: PODIATRIST | Admitting: PODIATRIST
Payer: MEDICARE

## 2025-08-19 ENCOUNTER — ANESTHESIA (OUTPATIENT)
Dept: OPERATING ROOM | Facility: HOSPITAL | Age: 75
End: 2025-08-19
Payer: MEDICARE

## 2025-08-19 VITALS
BODY MASS INDEX: 30.06 KG/M2 | WEIGHT: 210 LBS | SYSTOLIC BLOOD PRESSURE: 143 MMHG | TEMPERATURE: 97.5 F | OXYGEN SATURATION: 98 % | RESPIRATION RATE: 18 BRPM | HEART RATE: 70 BPM | HEIGHT: 70 IN | DIASTOLIC BLOOD PRESSURE: 67 MMHG

## 2025-08-19 DIAGNOSIS — G58.8 NEUROMA DIGITAL NERVE: Primary | ICD-10-CM

## 2025-08-19 PROCEDURE — 3700000002 HC GENERAL ANESTHESIA TIME - EACH INCREMENTAL 1 MINUTE: Performed by: PODIATRIST

## 2025-08-19 PROCEDURE — RXMED WILLOW AMBULATORY MEDICATION CHARGE

## 2025-08-19 PROCEDURE — 3700000001 HC GENERAL ANESTHESIA TIME - INITIAL BASE CHARGE: Performed by: PODIATRIST

## 2025-08-19 PROCEDURE — 64776 REMOVE DIGIT NERVE LESION: CPT | Performed by: PODIATRIST

## 2025-08-19 PROCEDURE — A64776 PR EXCISE DIGITAL NEUROMA: Performed by: ANESTHESIOLOGIST ASSISTANT

## 2025-08-19 PROCEDURE — 2500000001 HC RX 250 WO HCPCS SELF ADMINISTERED DRUGS (ALT 637 FOR MEDICARE OP): Performed by: STUDENT IN AN ORGANIZED HEALTH CARE EDUCATION/TRAINING PROGRAM

## 2025-08-19 PROCEDURE — 0752T DGTZ GLS MCRSCP SLD LVL III: CPT | Mod: TC,STJLAB | Performed by: PODIATRIST

## 2025-08-19 PROCEDURE — 7100000009 HC PHASE TWO TIME - INITIAL BASE CHARGE: Performed by: PODIATRIST

## 2025-08-19 PROCEDURE — 99100 ANES PT EXTEME AGE<1 YR&>70: CPT | Performed by: STUDENT IN AN ORGANIZED HEALTH CARE EDUCATION/TRAINING PROGRAM

## 2025-08-19 PROCEDURE — 3600000003 HC OR TIME - INITIAL BASE CHARGE - PROCEDURE LEVEL THREE: Performed by: PODIATRIST

## 2025-08-19 PROCEDURE — 2720000007 HC OR 272 NO HCPCS: Performed by: PODIATRIST

## 2025-08-19 PROCEDURE — 3600000008 HC OR TIME - EACH INCREMENTAL 1 MINUTE - PROCEDURE LEVEL THREE: Performed by: PODIATRIST

## 2025-08-19 PROCEDURE — 2500000004 HC RX 250 GENERAL PHARMACY W/ HCPCS (ALT 636 FOR OP/ED): Performed by: PODIATRIST

## 2025-08-19 PROCEDURE — 2500000004 HC RX 250 GENERAL PHARMACY W/ HCPCS (ALT 636 FOR OP/ED): Performed by: ANESTHESIOLOGIST ASSISTANT

## 2025-08-19 PROCEDURE — A64776 PR EXCISE DIGITAL NEUROMA: Performed by: STUDENT IN AN ORGANIZED HEALTH CARE EDUCATION/TRAINING PROGRAM

## 2025-08-19 PROCEDURE — 7100000010 HC PHASE TWO TIME - EACH INCREMENTAL 1 MINUTE: Performed by: PODIATRIST

## 2025-08-19 RX ORDER — CEFAZOLIN SODIUM 2 G/100ML
INJECTION, SOLUTION INTRAVENOUS AS NEEDED
Status: DISCONTINUED | OUTPATIENT
Start: 2025-08-19 | End: 2025-08-19

## 2025-08-19 RX ORDER — MIDAZOLAM HYDROCHLORIDE 1 MG/ML
INJECTION, SOLUTION INTRAMUSCULAR; INTRAVENOUS AS NEEDED
Status: DISCONTINUED | OUTPATIENT
Start: 2025-08-19 | End: 2025-08-19

## 2025-08-19 RX ORDER — FENTANYL CITRATE 50 UG/ML
INJECTION, SOLUTION INTRAMUSCULAR; INTRAVENOUS AS NEEDED
Status: DISCONTINUED | OUTPATIENT
Start: 2025-08-19 | End: 2025-08-19

## 2025-08-19 RX ORDER — OXYCODONE HYDROCHLORIDE 5 MG/1
5 TABLET ORAL EVERY 4 HOURS PRN
Status: DISCONTINUED | OUTPATIENT
Start: 2025-08-19 | End: 2025-08-19 | Stop reason: HOSPADM

## 2025-08-19 RX ORDER — LIDOCAINE HYDROCHLORIDE 20 MG/ML
INJECTION, SOLUTION EPIDURAL; INFILTRATION; INTRACAUDAL; PERINEURAL AS NEEDED
Status: DISCONTINUED | OUTPATIENT
Start: 2025-08-19 | End: 2025-08-19

## 2025-08-19 RX ORDER — BUPIVACAINE HYDROCHLORIDE 5 MG/ML
INJECTION, SOLUTION PERINEURAL AS NEEDED
Status: DISCONTINUED | OUTPATIENT
Start: 2025-08-19 | End: 2025-08-19 | Stop reason: HOSPADM

## 2025-08-19 RX ORDER — ALBUTEROL SULFATE 0.83 MG/ML
2.5 SOLUTION RESPIRATORY (INHALATION) ONCE AS NEEDED
Status: DISCONTINUED | OUTPATIENT
Start: 2025-08-19 | End: 2025-08-19 | Stop reason: HOSPADM

## 2025-08-19 RX ORDER — LABETALOL HYDROCHLORIDE 5 MG/ML
5 INJECTION, SOLUTION INTRAVENOUS ONCE AS NEEDED
Status: DISCONTINUED | OUTPATIENT
Start: 2025-08-19 | End: 2025-08-19 | Stop reason: HOSPADM

## 2025-08-19 RX ORDER — SODIUM CHLORIDE, SODIUM LACTATE, POTASSIUM CHLORIDE, CALCIUM CHLORIDE 600; 310; 30; 20 MG/100ML; MG/100ML; MG/100ML; MG/100ML
100 INJECTION, SOLUTION INTRAVENOUS CONTINUOUS
Status: ACTIVE | OUTPATIENT
Start: 2025-08-19 | End: 2025-08-19

## 2025-08-19 RX ORDER — FENTANYL CITRATE 50 UG/ML
25 INJECTION, SOLUTION INTRAMUSCULAR; INTRAVENOUS EVERY 5 MIN PRN
Status: DISCONTINUED | OUTPATIENT
Start: 2025-08-19 | End: 2025-08-19 | Stop reason: HOSPADM

## 2025-08-19 RX ORDER — MEPERIDINE HYDROCHLORIDE 50 MG/ML
12.5 INJECTION INTRAMUSCULAR; INTRAVENOUS; SUBCUTANEOUS EVERY 10 MIN PRN
Status: DISCONTINUED | OUTPATIENT
Start: 2025-08-19 | End: 2025-08-19 | Stop reason: HOSPADM

## 2025-08-19 RX ORDER — PROPOFOL 10 MG/ML
INJECTION, EMULSION INTRAVENOUS AS NEEDED
Status: DISCONTINUED | OUTPATIENT
Start: 2025-08-19 | End: 2025-08-19

## 2025-08-19 RX ORDER — OXYCODONE AND ACETAMINOPHEN 5; 325 MG/1; MG/1
1 TABLET ORAL EVERY 6 HOURS PRN
Qty: 5 TABLET | Refills: 0 | Status: SHIPPED | OUTPATIENT
Start: 2025-08-19

## 2025-08-19 RX ORDER — ONDANSETRON HYDROCHLORIDE 2 MG/ML
4 INJECTION, SOLUTION INTRAVENOUS ONCE AS NEEDED
Status: DISCONTINUED | OUTPATIENT
Start: 2025-08-19 | End: 2025-08-19 | Stop reason: HOSPADM

## 2025-08-19 RX ADMIN — OXYCODONE HYDROCHLORIDE 5 MG: 5 TABLET ORAL at 10:08

## 2025-08-19 RX ADMIN — CEFAZOLIN SODIUM 2 G: 2 INJECTION, SOLUTION INTRAVENOUS at 08:48

## 2025-08-19 RX ADMIN — PROPOFOL 150 MCG/KG/MIN: 10 INJECTION, EMULSION INTRAVENOUS at 08:49

## 2025-08-19 RX ADMIN — FENTANYL CITRATE 50 MCG: 50 INJECTION, SOLUTION INTRAMUSCULAR; INTRAVENOUS at 09:00

## 2025-08-19 RX ADMIN — LIDOCAINE HYDROCHLORIDE 50 MG: 20 INJECTION, SOLUTION EPIDURAL; INFILTRATION; INTRACAUDAL; PERINEURAL at 08:48

## 2025-08-19 RX ADMIN — MIDAZOLAM 2 MG: 1 INJECTION INTRAMUSCULAR; INTRAVENOUS at 08:48

## 2025-08-19 RX ADMIN — PROPOFOL 75 MG: 10 INJECTION, EMULSION INTRAVENOUS at 08:48

## 2025-08-19 SDOH — HEALTH STABILITY: MENTAL HEALTH: CURRENT SMOKER: 0

## 2025-08-19 ASSESSMENT — PAIN - FUNCTIONAL ASSESSMENT
PAIN_FUNCTIONAL_ASSESSMENT: 0-10

## 2025-08-19 ASSESSMENT — PAIN SCALES - GENERAL
PAINLEVEL_OUTOF10: 4
PAINLEVEL_OUTOF10: 5 - MODERATE PAIN
PAINLEVEL_OUTOF10: 4
PAINLEVEL_OUTOF10: 0 - NO PAIN
PAINLEVEL_OUTOF10: 3

## 2025-08-19 ASSESSMENT — PAIN DESCRIPTION - DESCRIPTORS
DESCRIPTORS: THROBBING
DESCRIPTORS: THROBBING

## 2025-08-25 LAB
LABORATORY COMMENT REPORT: NORMAL
PATH REPORT.FINAL DX SPEC: NORMAL
PATH REPORT.GROSS SPEC: NORMAL
PATH REPORT.RELEVANT HX SPEC: NORMAL
PATH REPORT.TOTAL CANCER: NORMAL

## 2025-09-04 ENCOUNTER — APPOINTMENT (OUTPATIENT)
Dept: PODIATRY | Facility: CLINIC | Age: 75
End: 2025-09-04
Payer: MEDICARE

## 2025-11-25 ENCOUNTER — APPOINTMENT (OUTPATIENT)
Dept: PRIMARY CARE | Facility: CLINIC | Age: 75
End: 2025-11-25
Payer: MEDICARE

## 2025-12-05 ENCOUNTER — APPOINTMENT (OUTPATIENT)
Dept: PRIMARY CARE | Facility: CLINIC | Age: 75
End: 2025-12-05
Payer: MEDICARE

## (undated) DEVICE — TOWEL PACK, STERILE, 4/PACK, BLUE

## (undated) DEVICE — Device

## (undated) DEVICE — SUTURE, ETHILON, 3-0, 18 IN, PS2, BLACK

## (undated) DEVICE — SOLUTION, IRRIGATION, STERILE WATER, 1000 ML, HANG BOTTLE

## (undated) DEVICE — DRESSING, GAUZE, SUPER KERLIX, 6X6

## (undated) DEVICE — BANDAGE, ESMARK, 4 IN X 12 FT, LF

## (undated) DEVICE — WOUND SYSTEM, DEBRIDEMENT & CLEANING, O.R DUOPAK

## (undated) DEVICE — DRESSING, GAUZE, PETROLATUM, PATCH, XEROFORM, 1 X 8 IN, STERILE

## (undated) DEVICE — SOLUTION, IRRIGATION, 0.9% SODIUM CHLORIDE, 1000 ML, HANG BOTTLE

## (undated) DEVICE — BANDAGE, ELASTIC, SELF-CLOSE, 4 IN, HONEYCOMB, STERILE

## (undated) DEVICE — GLOVE, SURGICAL, PROTEXIS PI , 6.5, PF, LF

## (undated) DEVICE — SUTURE, MONOCRYL PLUS, 3-0 1X27INCH, PS-2 UNDYED

## (undated) DEVICE — APPLICATOR, CHLORAPREP, W/ORANGE TINT, 26ML